# Patient Record
Sex: FEMALE | Race: WHITE | NOT HISPANIC OR LATINO | Employment: UNEMPLOYED | URBAN - METROPOLITAN AREA
[De-identification: names, ages, dates, MRNs, and addresses within clinical notes are randomized per-mention and may not be internally consistent; named-entity substitution may affect disease eponyms.]

---

## 2019-10-02 RX ORDER — ACEBUTOLOL HYDROCHLORIDE 200 MG/1
200 CAPSULE ORAL 2 TIMES DAILY
COMMUNITY
End: 2019-10-04 | Stop reason: SDUPTHER

## 2019-10-02 RX ORDER — FLUTICASONE PROPIONATE 50 MCG
2 SPRAY, SUSPENSION (ML) NASAL DAILY
COMMUNITY

## 2019-10-02 RX ORDER — PRAVASTATIN SODIUM 20 MG
20 TABLET ORAL DAILY
COMMUNITY

## 2019-10-02 RX ORDER — MONTELUKAST SODIUM 10 MG/1
10 TABLET ORAL NIGHTLY
COMMUNITY

## 2019-10-02 RX ORDER — LISINOPRIL 30 MG/1
30 TABLET ORAL DAILY
COMMUNITY

## 2019-10-02 RX ORDER — FLUDROCORTISONE ACETATE 0.1 MG/1
0.1 TABLET ORAL DAILY
COMMUNITY
End: 2019-10-04 | Stop reason: SDUPTHER

## 2019-10-02 RX ORDER — HYDROXYZINE 50 MG/1
50 TABLET, FILM COATED ORAL 3 TIMES DAILY PRN
COMMUNITY

## 2019-10-02 RX ORDER — GABAPENTIN 400 MG/1
300 CAPSULE ORAL 3 TIMES DAILY
COMMUNITY

## 2019-10-02 RX ORDER — BUDESONIDE AND FORMOTEROL FUMARATE DIHYDRATE 80; 4.5 UG/1; UG/1
2 AEROSOL RESPIRATORY (INHALATION)
COMMUNITY

## 2019-10-02 RX ORDER — QUETIAPINE FUMARATE 100 MG/1
100 TABLET, FILM COATED ORAL NIGHTLY
COMMUNITY

## 2019-10-02 RX ORDER — ALBUTEROL SULFATE 2.5 MG/3ML
2.5 SOLUTION RESPIRATORY (INHALATION) EVERY 4 HOURS PRN
COMMUNITY

## 2019-10-02 RX ORDER — DICYCLOMINE HYDROCHLORIDE 10 MG/1
10 CAPSULE ORAL 3 TIMES DAILY
COMMUNITY

## 2019-10-02 RX ORDER — RANITIDINE 150 MG/1
150 TABLET ORAL 2 TIMES DAILY
COMMUNITY

## 2019-10-03 ENCOUNTER — OFFICE VISIT (OUTPATIENT)
Dept: CARDIOLOGY | Facility: CLINIC | Age: 49
End: 2019-10-03

## 2019-10-03 VITALS
DIASTOLIC BLOOD PRESSURE: 60 MMHG | BODY MASS INDEX: 31.61 KG/M2 | HEIGHT: 60 IN | SYSTOLIC BLOOD PRESSURE: 125 MMHG | WEIGHT: 161 LBS

## 2019-10-03 DIAGNOSIS — G90.A POTS (POSTURAL ORTHOSTATIC TACHYCARDIA SYNDROME): ICD-10-CM

## 2019-10-03 DIAGNOSIS — R07.9 CHEST PAIN, UNSPECIFIED TYPE: Primary | ICD-10-CM

## 2019-10-03 DIAGNOSIS — I10 ESSENTIAL HYPERTENSION: ICD-10-CM

## 2019-10-03 DIAGNOSIS — R55 NEUROCARDIOGENIC PRE-SYNCOPE: ICD-10-CM

## 2019-10-03 PROCEDURE — 93000 ELECTROCARDIOGRAM COMPLETE: CPT | Performed by: INTERNAL MEDICINE

## 2019-10-03 PROCEDURE — 99204 OFFICE O/P NEW MOD 45 MIN: CPT | Performed by: INTERNAL MEDICINE

## 2019-10-03 RX ORDER — ERGOCALCIFEROL 1.25 MG/1
50000 CAPSULE ORAL WEEKLY
Refills: 0 | COMMUNITY
Start: 2019-09-27

## 2019-10-03 RX ORDER — RANITIDINE 300 MG/1
300 TABLET ORAL 2 TIMES DAILY
Refills: 3 | COMMUNITY
Start: 2019-09-27

## 2019-10-03 RX ORDER — POTASSIUM CHLORIDE 1500 MG/1
20 TABLET, FILM COATED, EXTENDED RELEASE ORAL DAILY
Refills: 3 | COMMUNITY
Start: 2019-09-27

## 2019-10-03 RX ORDER — LEVOTHYROXINE SODIUM 0.07 MG/1
37.5 TABLET ORAL DAILY
Refills: 4 | COMMUNITY
Start: 2019-09-06

## 2019-10-03 NOTE — PROGRESS NOTES
Subjective:     Encounter Date:10/03/2019      Patient ID: Kiya Ricketts is a 49 y.o. female.    Chief Complaint:  Patient is seen as a new patient for syncope    HPI:  Ms Ricketts is a 48 yo formally followed at  for syncope.  Her past medical history is significant for HTN, irritable bowel and recurrent syncope which has been labeled vasovagal and neurally mediated.  She also reports a history of POTS.  The details of her prior evaluation are not presently available.  She had been treated with midodrine in the past but it was stopped due to hypertension.  More recently she has been on acebutolol and Florinef with reasonably good control of her symptoms.    Today, she states that she has some occassional lightheadedness but has not had radha syncope.  She does also have occassional palpitations but they are fleeting and there is no sustained tachycardia.  She is complaining of some left sided chest discomfort described as a cramping dull sensation which occurs both at rest and with exertion.  There is no associated nausea, diaphoresis or radiation.      The following portions of the patient's history were reviewed and updated as appropriate: allergies, current medications, past family history, past medical history, past social history, past surgical history and problem list.    Problem List:  Patient Active Problem List   Diagnosis   • Neurocardiogenic pre-syncope   • POTS (postural orthostatic tachycardia syndrome)   • Essential hypertension   • Hypothyroidism (acquired)   • HLD (hyperlipidemia)   • Irritable bowel   • Chest pain       Past Medical History:  Past Medical History:   Diagnosis Date   • Hypertension    • Irritable bowel syndrome (IBS)    • POTS (postural orthostatic tachycardia syndrome)    • Vasovagal syncope        Past Surgical History:  History reviewed. No pertinent surgical history.    Social History:  Social History     Socioeconomic History   • Marital status: Single     Spouse name: Not  "on file   • Number of children: Not on file   • Years of education: Not on file   • Highest education level: Not on file   Tobacco Use   • Smoking status: Never Smoker   • Smokeless tobacco: Never Used   Substance and Sexual Activity   • Alcohol use: No     Frequency: Never       Allergies:  Allergies   Allergen Reactions   • Sulfa Antibiotics Unknown (See Comments)     .         Immunizations:    There is no immunization history on file for this patient.    ROS:  Review of Systems   Constitution: Negative for weakness and malaise/fatigue.   HENT: Negative.    Eyes: Negative.    Cardiovascular: Positive for chest pain, irregular heartbeat, near-syncope and palpitations. Negative for dyspnea on exertion, leg swelling, orthopnea, paroxysmal nocturnal dyspnea and syncope.   Respiratory: Negative for shortness of breath.    Endocrine: Negative.    Hematologic/Lymphatic: Negative.    Skin: Negative.    Musculoskeletal: Negative.    Gastrointestinal: Negative.    Genitourinary: Negative.    Neurological: Positive for dizziness.   Psychiatric/Behavioral: Negative.    Allergic/Immunologic: Negative.           Objective:         /60   Ht 152.4 cm (60\")   Wt 73 kg (161 lb)   BMI 31.44 kg/m²     Physical Exam   Constitutional: She is oriented to person, place, and time. She appears well-developed and well-nourished. No distress.   HENT:   Head: Normocephalic and atraumatic.   Eyes: Conjunctivae and EOM are normal. Pupils are equal, round, and reactive to light. No scleral icterus.   Neck: Normal range of motion. No thyromegaly present.   Cardiovascular: Normal rate, regular rhythm and normal heart sounds.   Pulmonary/Chest: Effort normal and breath sounds normal.   Abdominal: Soft. Bowel sounds are normal.   Musculoskeletal: Normal range of motion.   Neurological: She is alert and oriented to person, place, and time.   Skin: Skin is warm and dry.   Psychiatric: She has a normal mood and affect.       In-Office " Procedure(s):    ECG 12 Lead  Date/Time: 10/3/2019 11:12 AM  Performed by: Danial Heerdia MD  Authorized by: Danial Heredia MD   Previous ECG: no previous ECG available  Rhythm: sinus rhythm  Rate: normal  QRS axis: normal  Other findings: non-specific ST-T wave changes    Clinical impression: abnormal EKG            ASCVD RIsk Score::  The ASCVD Risk score (Ashcampliang OSORIO Jr., et al., 2013) failed to calculate for the following reasons:    Cannot find a previous HDL lab    Cannot find a previous total cholesterol lab    Recent Radiology:  Imaging Results (most recent)     None          Lab Review:   No visits with results within 6 Month(s) from this visit.   Latest known visit with results is:   No results found for any previous visit.                Assessment:          Diagnosis Plan   1. Chest pain, unspecified type  Stress Test With Myocardial Perfusion   2. POTS (postural orthostatic tachycardia syndrome)     3. Essential hypertension     4. Neurocardiogenic pre-syncope            Plan:      1. Neurocardiogenic near-syncope - seems to be doing well in Florinef and acebutolol, will continue same  2. HTN - controlled, continue current meds  3. Atypical chest pain - will schedule for Lexiscan      Level of Care:                 Danial Heredia MD  10/03/19  .

## 2019-10-04 DIAGNOSIS — I10 HYPERTENSION, UNSPECIFIED TYPE: Primary | ICD-10-CM

## 2019-10-04 RX ORDER — ACEBUTOLOL HYDROCHLORIDE 200 MG/1
200 CAPSULE ORAL 2 TIMES DAILY
Qty: 180 CAPSULE | Refills: 3 | Status: SHIPPED | OUTPATIENT
Start: 2019-10-04 | End: 2020-05-29 | Stop reason: SDUPTHER

## 2019-10-04 RX ORDER — FLUDROCORTISONE ACETATE 0.1 MG/1
0.1 TABLET ORAL DAILY
Qty: 90 TABLET | Refills: 3 | Status: SHIPPED | OUTPATIENT
Start: 2019-10-04

## 2019-10-05 PROBLEM — R55 NEUROCARDIOGENIC PRE-SYNCOPE: Status: ACTIVE | Noted: 2019-10-05

## 2019-10-05 PROBLEM — K58.9 IRRITABLE BOWEL: Status: ACTIVE | Noted: 2019-10-05

## 2019-10-05 PROBLEM — E03.9 HYPOTHYROIDISM (ACQUIRED): Status: ACTIVE | Noted: 2019-10-05

## 2019-10-05 PROBLEM — I10 ESSENTIAL HYPERTENSION: Status: ACTIVE | Noted: 2019-10-05

## 2019-10-05 PROBLEM — R07.9 CHEST PAIN: Status: ACTIVE | Noted: 2019-10-05

## 2019-10-05 PROBLEM — E78.5 HLD (HYPERLIPIDEMIA): Status: ACTIVE | Noted: 2019-10-05

## 2019-10-05 PROBLEM — G90.A POTS (POSTURAL ORTHOSTATIC TACHYCARDIA SYNDROME): Status: ACTIVE | Noted: 2019-10-05

## 2020-05-29 ENCOUNTER — TELEPHONE (OUTPATIENT)
Dept: CARDIOLOGY | Facility: CLINIC | Age: 50
End: 2020-05-29

## 2020-05-29 DIAGNOSIS — I10 HYPERTENSION, UNSPECIFIED TYPE: ICD-10-CM

## 2020-05-29 RX ORDER — ACEBUTOLOL HYDROCHLORIDE 200 MG/1
200 CAPSULE ORAL 2 TIMES DAILY
Qty: 180 CAPSULE | Refills: 1 | Status: SHIPPED | OUTPATIENT
Start: 2020-05-29 | End: 2020-11-30 | Stop reason: SDUPTHER

## 2020-08-06 ENCOUNTER — TELEPHONE (OUTPATIENT)
Dept: CARDIOLOGY | Facility: CLINIC | Age: 50
End: 2020-08-06

## 2020-08-06 NOTE — TELEPHONE ENCOUNTER
I spoke with pt. She had questions about restarting florinef. She states she stopped med in March 2020. She has been told she has RA and her markers are high. She states her PCP advised her to call here to see if she should restart med.  Pt was last seen 10/3/2020. I advised she been seen in the office to safely address medications. Appt moved up to 8/10/2020. RTRN

## 2020-08-06 NOTE — TELEPHONE ENCOUNTER
Dr Heredia    Mrs Kiya Ricketts is requesting a return call. Was just recently admitted to [facility] 7-23-20 thru 7-25-20. Would like to speak about her recent diagnosis and about her medication.    [phone]

## 2020-08-10 ENCOUNTER — OFFICE VISIT (OUTPATIENT)
Dept: CARDIOLOGY | Facility: CLINIC | Age: 50
End: 2020-08-10

## 2020-08-10 VITALS
BODY MASS INDEX: 31.8 KG/M2 | WEIGHT: 162 LBS | RESPIRATION RATE: 16 BRPM | TEMPERATURE: 97.3 F | SYSTOLIC BLOOD PRESSURE: 98 MMHG | HEART RATE: 64 BPM | HEIGHT: 60 IN | DIASTOLIC BLOOD PRESSURE: 62 MMHG

## 2020-08-10 DIAGNOSIS — G90.A POTS (POSTURAL ORTHOSTATIC TACHYCARDIA SYNDROME): Primary | ICD-10-CM

## 2020-08-10 PROCEDURE — 99212 OFFICE O/P EST SF 10 MIN: CPT | Performed by: INTERNAL MEDICINE

## 2020-08-10 RX ORDER — VENLAFAXINE HYDROCHLORIDE 75 MG/1
75 CAPSULE, EXTENDED RELEASE ORAL DAILY
COMMUNITY

## 2020-08-10 NOTE — PROGRESS NOTES
"  Subjective:     Encounter Date:08/10/2020      Patient ID: Kiya Ricketts is a 49 y.o. female.    Chief Complaint:  Followup atypical chest pain    HPI:  Ms Ricketts is a 50 yo formally followed at  for syncope.  Her past medical history is significant for HTN, irritable bowel and recurrent syncope which has been labeled vasovagal and neurally mediated.  She also reports a history of POTS.  The details of her prior evaluation are not presently available.  She had been treated with midodrine in the past but it was stopped due to hypertension.  More recently she has been on acebutolol and Florinef with reasonably good control of her symptoms.    She was admitted to [facility] 7/2020 with atypical chest pain and she had an echo and nuclear stress test done.  The reports are not presently available but she states that they were \"ok\".    She has continued to have a multitude of symptoms including pain in her chest, hands, extremities.  She was seen by her PCP who did some bloodwork for rheumatoligic workup and she is scheduled to see a rheumatolgist.     She has not had any more syncope or significant palpitations.      The following portions of the patient's history were reviewed and updated as appropriate: allergies, current medications, past family history, past medical history, past social history, past surgical history and problem list.    Problem List:  Patient Active Problem List   Diagnosis   • Neurocardiogenic pre-syncope   • POTS (postural orthostatic tachycardia syndrome)   • Essential hypertension   • Hypothyroidism (acquired)   • HLD (hyperlipidemia)   • Irritable bowel   • Chest pain       Past Medical History:  Past Medical History:   Diagnosis Date   • Hypertension    • Irritable bowel syndrome (IBS)    • POTS (postural orthostatic tachycardia syndrome)    • Vasovagal syncope        Past Surgical History:  Past Surgical History:   Procedure Laterality Date   • CHOLECYSTECTOMY         Social " "History:  Social History     Socioeconomic History   • Marital status: Single     Spouse name: Not on file   • Number of children: Not on file   • Years of education: Not on file   • Highest education level: Not on file   Tobacco Use   • Smoking status: Never Smoker   • Smokeless tobacco: Never Used   Substance and Sexual Activity   • Alcohol use: No     Frequency: Never       Allergies:  Allergies   Allergen Reactions   • Sulfa Antibiotics Unknown (See Comments)     .         Immunizations:    There is no immunization history on file for this patient.    ROS:  Review of Systems   Constitution: Positive for malaise/fatigue.   Cardiovascular: Positive for chest pain. Negative for dyspnea on exertion, irregular heartbeat, near-syncope, orthopnea, palpitations, paroxysmal nocturnal dyspnea and syncope.   Respiratory: Negative for shortness of breath.    Musculoskeletal: Positive for back pain and joint pain.   Psychiatric/Behavioral: The patient is nervous/anxious.    All other systems reviewed and are negative.         Objective:         BP 98/62   Pulse 64   Temp 97.3 °F (36.3 °C)   Resp 16   Ht 152.4 cm (60\")   Wt 73.5 kg (162 lb)   BMI 31.64 kg/m²     Physical Exam   Constitutional: She is oriented to person, place, and time. She appears well-developed and well-nourished. No distress.   HENT:   Head: Normocephalic and atraumatic.   Eyes: Pupils are equal, round, and reactive to light. Conjunctivae and EOM are normal. No scleral icterus.   Neck: Normal range of motion. No thyromegaly present.   Cardiovascular: Normal rate, regular rhythm and normal heart sounds.   Pulmonary/Chest: Effort normal and breath sounds normal.   Abdominal: Soft. Bowel sounds are normal.   Musculoskeletal: Normal range of motion.   Neurological: She is alert and oriented to person, place, and time.   Skin: Skin is warm and dry.   Psychiatric: She has a normal mood and affect.       In-Office Procedure(s):  Procedures    ASCVD RIsk " Score::  The ASCVD Risk score (Ubladoliang OSORIO Jr., et al., 2013) failed to calculate for the following reasons:    Cannot find a previous HDL lab    Cannot find a previous total cholesterol lab    Recent Radiology:  Imaging Results (Most Recent)     None          Lab Review:   No visits with results within 2 Month(s) from this visit.   Latest known visit with results is:   No results found for any previous visit.                Assessment:          Diagnosis Plan   1. POTS (postural orthostatic tachycardia syndrome)            Plan:      1. Neurocardiogenic near-syncope - seems to be doing well in Florinef and acebutolol, will continue same  2. HTN - controlled, continue current meds  3. Atypical chest pain - normal Lexiscan    RTC 1 year       Level of Care:                 Danial Heredia MD  08/10/20  .

## 2020-11-30 DIAGNOSIS — I10 HYPERTENSION, UNSPECIFIED TYPE: ICD-10-CM

## 2020-11-30 NOTE — TELEPHONE ENCOUNTER
DEAM PT  PT CALLED 240PM  PT NEEDS REFILLS SENT TO [facility] ON [address]   FOR ACEBUTOLOL  200MG, BID,     PT HAS LAST DOSE ON Wednesday 12/2/20

## 2020-12-01 ENCOUNTER — TELEPHONE (OUTPATIENT)
Dept: CARDIOLOGY | Facility: CLINIC | Age: 50
End: 2020-12-01

## 2020-12-01 NOTE — TELEPHONE ENCOUNTER
12/1/2020-Dr. Heredia  Pt. Called stated that she called last week to let someone know that she was out of her Acebutolol 200mg, #60, 1 tab po bid. She uses [facility] . I see where she spoke to Jessie yesterday but I donot see where she spoke with anyone last week. She would like her medicine called in. I explained [facility] new policy regarding RXs but she was very frustrated. I told her we would do the best we could to try to get it filled quickly.

## 2020-12-02 RX ORDER — ACEBUTOLOL HYDROCHLORIDE 200 MG/1
200 CAPSULE ORAL 2 TIMES DAILY
Qty: 180 CAPSULE | Refills: 1 | Status: SHIPPED | OUTPATIENT
Start: 2020-12-02 | End: 2021-05-26 | Stop reason: SDUPTHER

## 2021-05-26 DIAGNOSIS — I10 HYPERTENSION, UNSPECIFIED TYPE: ICD-10-CM

## 2021-05-26 RX ORDER — ACEBUTOLOL HYDROCHLORIDE 200 MG/1
200 CAPSULE ORAL 2 TIMES DAILY
Qty: 180 CAPSULE | Refills: 1 | Status: SHIPPED | OUTPATIENT
Start: 2021-05-26 | End: 2021-06-24 | Stop reason: SDUPTHER

## 2021-05-26 NOTE — TELEPHONE ENCOUNTER
"AGD  Pt called needing refills of \"acebutolol (SECTRAL) 200 MG capsule BID\" (q90 day) with refills sent to [id number]    Pt CB# [phone]  "

## 2021-06-24 DIAGNOSIS — I10 HYPERTENSION, UNSPECIFIED TYPE: ICD-10-CM

## 2021-06-25 RX ORDER — ACEBUTOLOL HYDROCHLORIDE 200 MG/1
200 CAPSULE ORAL 2 TIMES DAILY
Qty: 180 CAPSULE | Refills: 0 | Status: SHIPPED | OUTPATIENT
Start: 2021-06-25